# Patient Record
Sex: MALE | Race: WHITE | Employment: OTHER | URBAN - METROPOLITAN AREA
[De-identification: names, ages, dates, MRNs, and addresses within clinical notes are randomized per-mention and may not be internally consistent; named-entity substitution may affect disease eponyms.]

---

## 2020-02-04 ENCOUNTER — APPOINTMENT (OUTPATIENT)
Dept: GENERAL RADIOLOGY | Age: 55
End: 2020-02-04
Payer: COMMERCIAL

## 2020-02-04 ENCOUNTER — HOSPITAL ENCOUNTER (EMERGENCY)
Age: 55
Discharge: HOME | End: 2020-02-04
Attending: EMERGENCY MEDICINE
Payer: COMMERCIAL

## 2020-02-04 VITALS
HEIGHT: 70 IN | WEIGHT: 210 LBS | SYSTOLIC BLOOD PRESSURE: 168 MMHG | DIASTOLIC BLOOD PRESSURE: 86 MMHG | BODY MASS INDEX: 30.06 KG/M2 | HEART RATE: 71 BPM | TEMPERATURE: 98 F | RESPIRATION RATE: 16 BRPM | OXYGEN SATURATION: 100 %

## 2020-02-04 PROCEDURE — 99283 EMERGENCY DEPT VISIT LOW MDM: CPT

## 2020-02-04 PROCEDURE — 73110 X-RAY EXAM OF WRIST: CPT

## 2020-02-04 PROCEDURE — 73080 X-RAY EXAM OF ELBOW: CPT

## 2020-02-04 PROCEDURE — 6370000000 HC RX 637 (ALT 250 FOR IP): Performed by: EMERGENCY MEDICINE

## 2020-02-04 RX ORDER — IBUPROFEN 400 MG/1
800 TABLET ORAL ONCE
Status: COMPLETED | OUTPATIENT
Start: 2020-02-04 | End: 2020-02-04

## 2020-02-04 RX ORDER — ASPIRIN 325 MG
325 TABLET ORAL DAILY
COMMUNITY

## 2020-02-04 RX ADMIN — IBUPROFEN 800 MG: 400 TABLET, FILM COATED ORAL at 18:27

## 2020-02-04 ASSESSMENT — PAIN DESCRIPTION - PAIN TYPE
TYPE: ACUTE PAIN
TYPE: ACUTE PAIN

## 2020-02-04 ASSESSMENT — PAIN DESCRIPTION - ORIENTATION
ORIENTATION: RIGHT
ORIENTATION: RIGHT

## 2020-02-04 ASSESSMENT — PAIN DESCRIPTION - LOCATION
LOCATION: ELBOW
LOCATION: ELBOW

## 2020-02-04 ASSESSMENT — PAIN SCALES - GENERAL
PAINLEVEL_OUTOF10: 4
PAINLEVEL_OUTOF10: 2
PAINLEVEL_OUTOF10: 4

## 2020-02-04 NOTE — ED PROVIDER NOTES
Triage Chief Complaint:   Arm Pain (left, reports fall 2 days ago)      JOES Ulrich is a 48 y.o. male that presents for:    Chief complaint:  Arm pain    Location:  L elbow and wrist  Patient is L handed    Quality/Characteristic:  Margie Gobble onto L arm    Duration:  Occurred sunday    Aggravating/Alleviating factors:  Taking tylenol at home for pain    Associated symptoms:  No numbness, tingling, weakness, swelling. Severity:  Mild to moderate. Review of medical records: [place] PDMP negative on search. ROS:    Constitutional: No fevers/chills. No weight changes. No night sweats. Eyes:  No blurry vision or double vision. No drainage. Ears, Nose, Throat:  No hearing changes. No rhinitis. No sore throat or cough. Cardiac: No chest pain or pressure. No arm/jaw pain. No palpitations. No lightheadedness. No claudication symptoms. Respiratory:  No dyspnea. No hemoptysis. GI: No abdominal pain. No n/v/d. No hematochezia or melena. :  No hematuria. No dysuria. No discharge. MSK: +LUE pain after fall. Skin:  No rashes. No pruritis. Neuro:  No numbness, tingling or weakness in any extremity or face. No headache. No incoordination. No speech difficulties. No seizures. At least 10 point systems reviewed and otherwise acutely negative except as in the [address] Past Medical History:   Diagnosis Date    Hypertension      Past Surgical History:   Procedure Laterality Date    HERNIA REPAIR       History reviewed. No pertinent family history.   Social History     Socioeconomic History    Marital status: Single     Spouse name: Not on file    Number of children: Not on file    Years of education: Not on file    Highest education level: Not on file   Occupational History    Not on file   Social Needs    Financial resource strain: Not on file    Food insecurity:     Worry: Not on file     Inability: Not on file    Transportation needs:     Medical: Not on file     Non-medical: Not on file   Tobacco Use    Smoking status: Never Smoker    Smokeless tobacco: Never Used   Substance and Sexual Activity    Alcohol use: Not Currently    Drug use: Never    Sexual activity: Not on file   Lifestyle    Physical activity:     Days per week: Not on file     Minutes per session: Not on file    Stress: Not on file   Relationships    Social connections:     Talks on phone: Not on file     Gets together: Not on file     Attends Zoroastrian service: Not on file     Active member of club or organization: Not on file     Attends meetings of clubs or organizations: Not on file     Relationship status: Not on file    Intimate partner violence:     Fear of current or ex partner: Not on file     Emotionally abused: Not on file     Physically abused: Not on file     Forced sexual activity: Not on file   Other Topics Concern    Not on file   Social History Narrative    Not on file     No current facility-administered medications for this encounter. Current Outpatient Medications   Medication Sig Dispense Refill    aspirin 325 MG tablet Take 325 mg by mouth daily       Allergies   Allergen Reactions    Penicillins Nausea Only    Percocet [Oxycodone-Acetaminophen] Itching     Nursing Notes Reviewed    Physical Exam:  ED Triage Vitals [02/04/20 1806]   Enc Vitals Group      BP (!) 168/86      Pulse 71      Resp 16      Temp 98 °F (36.7 °C)      Temp Source Oral      SpO2 100 %      Weight 210 lb (95.3 kg)      Height 5' 10\" (1.778 m)      Head Circumference       Peak Flow       Pain Score       Pain Loc       Pain Edu? Excl. in [address]? GENERAL APPEARANCE: alert, lying supine in gurney, cooperative with exam.  HEAD: normocephalic, atraumatic  EYES:  EOMI, conjunctiva clear. NOSE: no nasal discharge. MOUTH: moist mucous membranes  NECK: supple, no neck tenderness, normal ROM, no JVD  BACK: no back tenderness.  no CVA tenderness  LUNGS: no wheezing, no rales, no rhonchi, no accessory muscle use. good air  exchange bilateral.  HEART: normal rate, normal rhythm, no murmur, no rub. ABDOMEN: normal appearance, normal BS, soft, no abd tenderness, no guarding, no  organomegaly, no abd masses. RECTAL: deffered  EXTREMITIES: ttp over L elbow and wrist.  no other joint swelling\tenderness, no edema, normal ROM. SKIN: warm, dry, good color, no rash. NEURO: Alert and oriented, motor intact, sensory intact. I have reviewed and interpreted all of the currently available lab results from this visit (if applicable):  No results found for this visit on 02/04/20. Radiographs:  [] Radiologist's Report Reviewed:   XR ELBOW LEFT (MIN 3 VIEWS)   Final Result   No acute abnormality         XR WRIST LEFT (MIN 3 VIEWS)   Final Result   No acute osseous abnormality. EKG: (All EKG's are interpreted by myself in the absence of a cardiologist)    MDM:  6:31 PM xrays ordered. ------------------------------------------------    Follow up: xrays negative. DC. Final Impression:  1. Left arm pain        Discharge referral (if applicable): Follow-up with your primary care physician within 3 to 5 days for reevaluation or return to the ER for any worsening symptoms in any way.           Discharge medications (if applicable):  Discharge Medication List as of 2/4/2020  7:13 PM          (Please note that portions of this note may have been completed with a voice recognition program. Efforts were made to edit the dictations but occasionally words are mis-transcribed.)    MD Varghese Tanner MD  02/06/20 515